# Patient Record
Sex: MALE | Race: OTHER
[De-identification: names, ages, dates, MRNs, and addresses within clinical notes are randomized per-mention and may not be internally consistent; named-entity substitution may affect disease eponyms.]

---

## 2018-07-22 ENCOUNTER — HOSPITAL ENCOUNTER (EMERGENCY)
Dept: HOSPITAL 72 - EMR | Age: 33
Discharge: HOME | End: 2018-07-22
Payer: MEDICAID

## 2018-07-22 VITALS — DIASTOLIC BLOOD PRESSURE: 70 MMHG | SYSTOLIC BLOOD PRESSURE: 110 MMHG

## 2018-07-22 VITALS — WEIGHT: 140 LBS | BODY MASS INDEX: 23.9 KG/M2 | HEIGHT: 64 IN

## 2018-07-22 DIAGNOSIS — S60.454A: Primary | ICD-10-CM

## 2018-07-22 DIAGNOSIS — S61.234A: ICD-10-CM
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
## 2018-07-22 PROCEDURE — 99283 EMERGENCY DEPT VISIT LOW MDM: CPT

## 2018-07-22 NOTE — EMERGENCY ROOM REPORT
History of Present Illness


General


Chief Complaint:  Foreign Body


Source:  Patient





Present Illness


HPI


32-year-old male patient presents ER complaining of splinter in his right ring 

finger 2 days.  Reports that he attempted to remove it himself but was 

unsuccessful.  Reports that it is over his PIP joints on the dorsal side, 

states that it went "straight downward" into his finger.  Reports he is right-

hand dominant.  Denies fever, chest pain, shortness of breath.  Denies erythema 

or edema of joint.  Reports pain with movement of finger. Reports received 

tetanus vaccination "3 or 4 years ago."


Allergies:  


Coded Allergies:  


     No Known Allergies (Unverified , 7/22/18)





Patient History


Past Medical History:  see triage record


Reviewed Nursing Documentation:  PMH: Agreed; PSxH: Agreed





Nursing Documentation-PMH


Past Medical History:  No Stated History





Review of Systems


All Other Systems:  negative except mentioned in HPI





Physical Exam





Vital Signs








  Date Time  Temp Pulse Resp B/P (MAP) Pulse Ox O2 Delivery O2 Flow Rate FiO2


 


7/22/18 18:44 98.3 66 16 110/70 99 Room Air  





 98.2       








Sp02 EP Interpretation:  reviewed, normal


General Appearance:  well appearing, no apparent distress, alert, GCS 15, non-

toxic


Head:  normocephalic, atraumatic


Eyes:  bilateral eye normal inspection, bilateral eye PERRL


ENT:  hearing grossly normal, normal pharynx, no angioedema, normal voice, 

uvula midline, moist mucus membranes


Neck:  full range of motion


Respiratory:  lungs clear, normal breath sounds, no rhonchi, no respiratory 

distress, no accessory muscle use, no wheezing, speaking full sentences


Cardiovascular #1:  regular rate, rhythm, no edema


Cardiovascular #2:  2+ radial (R), 2+ radial (L)


Musculoskeletal:  back normal, digits/nails normal, gait/station normal, non-

tender, decreased range of motion - secondary to pain, other - NVI, tender - 

PIP joint of right ring finger


Skin:  other - 2-3 cm puncture wound on dorsum of right hand on right ring 

finger on the ulnar side near the PIP joint, no erythema or edema, scabbed over





Procedures


Laceration/Wound Repair


Laceration/Wound Repair :  


   Consent:  Verbal


   Wound Location:  upper extremity - right index finger


   Wound's Depth, Shape:  superficial


   Wound Length (cm):  1


   Wound Explored:  no foreign body removed


   Irrigated w/ Saline (ccs):  10


   Betadine Prep?:  Yes


   Anesthesia:  1% Lidocaine


   Volume Anesthetic (ccs):  2


   Layer Closure?:  No


   Splint Applied?:  Yes


   Sling Applied?:  No


   Patient Tolerated:  Well


   Complications:  None





Medical Decision Making


PA Attestation


Dr. Baker is my supervising Physician whom patient management has been 

discussed with.


Diagnostic Impression:  


 Primary Impression:  


 Splinter in skin


ER Course


Pt. presents to the ED c/o splinter in finger.





Ddx considered but are not limited to tendon foreign body, cellulitis, abscess, 

fracture


no fusiform swelling, no flexor tendon tenderness to palpation, low suspicion 

for flexor tenosynovitis.





Vital signs: are WNL, pt. is afebrile





ER COURSE:


Xray of the right hand shows no foreign body, no acute disease per the 

preliminary reading.


Verbal consent provided by patient to attempt removal of foreign body.  


Digital block performed using 2 mL of 1% lidocaine without epi.  


Wound irrigated and clean with copious amounts of water and betadine. No FB 

visualized at that time.


Small <4 mm linear laceration was made at site of splinter puncture wound. 


Unable to visualize and remove foreign body from Finger. 


Will not suture wound, will allow to heal by secondary intention.


Wound cleaned and irrigated with saline and dressed with sterile gauze, 

bacitracin applied, finger splinted. 


Splint was applied to the right ring finger was checked afterwards by me 

showing good alignment and support with distal neurovascular functioning intact.





Patient reports pain symptoms improved since arrival to ER.


Provided patient with referrals to hand specialist to discuss removal of 

splinter. Contact  hand specialist to schedule appointment.


Will provide patient with antibiotic treatment at discharge to home.


Keep wound clean and dry.


provided antibiotic coverage to help prevent infection with topical and oral 

abx.





DISCHARGE: 


Rx provided for Keflex


Rx provided for Bactroban


Rx provided for ibuprofen





At this time pt is stable for d/c to home.  Patient is resting comfortably, in 

no acute distress, nontoxic appearing, talking without difficulty.


Patient to take medications as instructed


Will provide with patient care instructions and any necessary prescriptions.


Care plan and follow-up instructions provided.  


Patient instructed to follow-up with primary care provider in 3 - 5 days.


Patient questions asked and answered.


Patient reports understanding and agreement to treatment plan.


ER precautions given. Patient instructed to return to ER immediately for any 

new or worsening of symptoms including but not limited to increasing SOB, 

persistent fever, chest pain, intractable vomiting.





- Please note that this Emergency Department Report was dictated using [employer] technology software, occasionally this can lead to 

erroneous entry secondary to interpretation by the dictation equipment.


Other X-Ray Diagnostic Results


Other X-Ray Diagnostic Results :  


   X-Ray ordered:  right hand


   # of Views/Limited Vs Complete:  3 View


   Indication:  Pain


   EP Interpretation:  Yes


   PA Xray:  Interpretation reviewed, by supervising MD, and agrees with 

findings.


   Interpretation:  no dislocation, no soft tissue swelling, no fractures


   Impression:  No acute disease


   PA ScribSusanna Sanchez PA-C





Last Vital Signs








  Date Time  Temp Pulse Resp B/P (MAP) Pulse Ox O2 Delivery O2 Flow Rate FiO2


 


7/22/18 18:51 98.2  16 110/70 99 Room Air  





 98.2       


 


7/22/18 18:44  66      








Disposition:  HOME, SELF-CARE


Condition:  Stable


Scripts


Ibuprofen* (MOTRIN*) 600 Mg Tablet


600 MG ORAL Q8H PRN for For Pain, #30 TAB 0 Refills


   Prov: Angel Luis Sanchez         7/22/18 


Mupirocin Calcium (Bactroban) 15 Gm Cream..g.


1 APPLIC TOPIC THREE TIMES A DAY for 7 Days, #15 GM


   Prov: Angel Luis Sanchez         7/22/18 


Cephalexin* (KEFLEX*) 500 Mg Capsule


500 MG ORAL EVERY 12 HOURS, #14 CAP 0 Refills


   Prov: Angel Luis Sanchez         7/22/18


Patient Instructions:  Nonsutured Laceration Care





Additional Instructions:  


Followup with primary care provider in 3 -5 days.


Followup with hand specialist. Contact tomorrow to schedule appointment to 

discuss splinter removal.


Take medications as directed. 


Keep wound clean and dry.


Patient questions asked and answered.


ER precautions given, patient instructed to return to ER immediately for any 

new or worsening of symptoms.











Angel Luis Sanchez Jul 22, 2018 19:11

## 2018-07-23 NOTE — DIAGNOSTIC IMAGING REPORT
Indication: Pain. Foreign body

 

Technique: XRAY Hand Complete R

 

Comparison: None

 

Findings: No evidence of acute fracture. Alignment and joint spaces within normal

limits. No focal soft tissue defect identified. No radiopaque foreign body.

 

Impression: No acute fracture or dislocation. No radiopaque foreign body identified.